# Patient Record
Sex: MALE | Race: WHITE | URBAN - METROPOLITAN AREA
[De-identification: names, ages, dates, MRNs, and addresses within clinical notes are randomized per-mention and may not be internally consistent; named-entity substitution may affect disease eponyms.]

---

## 2017-01-19 ENCOUNTER — PRE VISIT (OUTPATIENT)
Dept: OTOLARYNGOLOGY | Facility: CLINIC | Age: 57
End: 2017-01-19

## 2017-01-19 NOTE — TELEPHONE ENCOUNTER
1.  Date/reason for appt: 2/22/17 sinus/facial pain   2.  Referring provider: self   3.  Call to patient (Yes / No - short description): no, transferred from    4.  Previous care at / records requested from: [facility], [facility] and [facility] [place]   5.  Other: Emailed ROIs to [email or web address]

## 2017-01-23 NOTE — TELEPHONE ENCOUNTER
Records received from [facility]. Forwarded records to ENT clinic.    Records included:  Neurological Consultation Note: 12/21/16  Radiology reports (Waiting in imaging disc)   -MRI Brain 12/30/16   -Cervical MRI Angio 12/30/16   -Cerebral MR Angio 12/30/16      Missing:    -Pt has additional records/imaging with PCP Dr. Marcelo Kurtz, Dr. Worthington (Otholaryngology) & Dr. Banuelos (Otholaryngology) @ [facility]   (Daphnie already requested on 1/19/17, waiting on records.)     -[facility] 8/29/16, 12/8/16 (Need to request)     -Pt had left septoplasty and turbinoplasty done Sept 2016 (Not sure where)

## 2017-02-07 NOTE — TELEPHONE ENCOUNTER
Faxed request to [facility] for images, [facility] for op-note and [facility].

## 2017-02-07 NOTE — TELEPHONE ENCOUNTER
Radiology reports received from [facility], will forward to clinic.  CT sinuses on 8/29/16, 12/8/16

## 2017-02-10 NOTE — TELEPHONE ENCOUNTER
Additional records received from [facility], will forward to clinic. Faxed request for images to be pushed.

## 2017-02-16 NOTE — TELEPHONE ENCOUNTER
Imaging from [facility] still not received. Called and spoke with Marcello, she will mail imaging disc.

## 2017-02-22 ENCOUNTER — OFFICE VISIT (OUTPATIENT)
Dept: OTOLARYNGOLOGY | Facility: CLINIC | Age: 57
End: 2017-02-22

## 2017-02-22 VITALS — BODY MASS INDEX: 28.61 KG/M2 | HEIGHT: 66 IN | WEIGHT: 178 LBS

## 2017-02-22 DIAGNOSIS — R51.9 FACIAL PAIN: Primary | ICD-10-CM

## 2017-02-22 RX ORDER — IBUPROFEN 800 MG/1
800 TABLET, FILM COATED ORAL
COMMUNITY
Start: 2016-09-26

## 2017-02-22 ASSESSMENT — PAIN SCALES - GENERAL: PAINLEVEL: MILD PAIN (2)

## 2017-02-22 NOTE — PATIENT INSTRUCTIONS
Plan of care:  After Dr Christensen has talked to Dr Tom we will give you a call  Clinic contact information:  1. To schedule an appointment call [phone], option 1  2. To talk to the Triage RN call [phone], option 3  3. If you need to speak to Edith or get a message to your doctor on a Friday, call the triage RN  4. EdithRN: [phone]  5. Surgery scheduling:      Amparo Truong: [phone]      Sandy Carter: [phone]  6. Fax: [phone]  7. [phone]

## 2017-02-22 NOTE — LETTER
2017       RE: Anjel Ying  [address] [place] [address]     Dear Colleague,    Thank you for referring your patient, Anjel Ying, to the [facility] E[facility] at [facility]. Please see a copy of my visit note below.      Otolaryngology Adult Consultation    Patient: Anjel Ying   : 1960     Patient presents with:  Consult:   Chief Complaint   Patient presents with     Consult     New Rhinology Facial/sinus pain, feels congested constantly         Referring Provider: Referred Self   Consulting Physician:  Elvie Christensen MD       Assessment/Plan: Anjel's nasal exam is essentially normal.  He has some fullness of the inferior turbinate on that right side and he has nasal valve collapse, left greater than right, so he still has ongoing mild obstruction on that right side.  I do not think that has anything to do his pain, however, I suspect his pain is either related to previous neck injury or related to possible residual issues from his wisdom tooth extraction.  I am going to discuss this case with my colleague, Dr. Tom.  We will see if we can have this area of the wisdom tooth further evaluated.  We may also have him go back to his primary to discuss physical therapy or other treatments for his neck pain.          HPI: Anjel Ying is a 56 year old male seen today in the Otolaryngology Clinic for greater than two year history of right-sided facial pain.  He states that he had possibly been having pain before that off and on but he woke up in January of last year with what he describes as a light headache.  It was fairly mild but was persistent for 5-6 weeks.  It was in the right cheek and extended up around his right eye to his forehead.  He did take a course of antibiotics and had some improvement and the pain then became localized to the right cheek area where it continues to persist to this day.  He describes that it is currently 1.5-2/10 but will get up  to 7-8/10.  It is worse after he drinks alcohol.  It is better if he sticks a Kleenex up in his nose and removes some sticky mucous.  He can breathe better for a short period of time.  The pain is slightly better for just a few seconds to minutes.  He did see Dr. Worthington and Dr. Banuelos.  He underwent a septoplasty in September of last year.  He states that overall his symptoms were slightly better for a while but he is now back to his baseline right-sided facial pain.  Four years ago, he had a right upper wisdom tooth removed.  It was very difficult to remove and took several hours.  He has not had followup for that.  He denies any skin changes at the time that he had the facial pain such as blisters on his face to suggest zoster.  He occasionally has some numbness under the eye and in the cheek area.  He works in a machine shop.  He cannot describe any trauma that could have caused this.     H/O neck injury, surgery recommended, he declined.  Still has pain.      No current outpatient prescriptions on file prior to visit.  No current facility-administered medications on file prior to visit.      No Known Allergies    Past Medical History   Diagnosis Date     Allergic rhinitis as a child     as a child     Chronic sinusitis 2 years     2 years     Tinnitus 10 years     10 years         Review of Systems   ENT ROS 2/20/2017   Ears, Nose, Throat Ringing/noise in ears, Nasal congestion or drainage   Allergy/Immunology Allergies or hay fever        14 point ROS neg other than the symptoms noted above.    Physical Exam:    General Assessment   The patient is alert, oriented and in no acute distress.   Head/Face/Scalp  Grossly normal.   Ears  Normal canals, auricles and tympanic membranes.   Nose  External nose is straight, skin is normal. Intranasal exam (anterior rhinoscopy) reveals normal moist mucosa, turbinate tissue without edema, erythema or crusting.  Septum mainly in the midline.    Mouth  Oral cavity shows  healthy mucosa with out ulceration, masses or other lesions involving the tongue, palate, buccal mucosa, floor of mouth or gingiva.  Dentition in good repair.  Oropharynx with normal tonsils, posterior wall and base of tongue.  Significant pain R posterior upper alveolus to palpation  Neck  No significant adenopathy, thyroid or salivary gland abnormality. Posterior neck pain on right to palpation    Procedure:  Nasal endoscopy performed to examine the posterior nasal passages for pathology.    Verbal consent obtained. Topical anesthetic/decongestant solution applied per patient request.   A rigid endoscope was passed into each nasal cavity separately.  Findings are as follows:   Left middle meatus:  Normal healthy meatus, no purulence or polyps.    Left posterior nasal cavity:  No masses, lesions or abnormal tissue.   Right middle meatus:  Normal healthy meatus, no purulence or polyps   Right posterior nasal cavity:  No masses, lesions or abnormal tissue, generous posterior inferior turbinate   Nasopharynx:  Clear, no lesions, crusting or inflammation       SNOT20:  Average and Sum Patient-Supplied Answers for the SNOT-20 2/20/2017   Total SNOT-20 Score (Average) 1.54   SNOT Score: Sum of responses 27     Imaging:    Outside imaging reviewed - normal MRI.  CT report reviewed, exam not available.    Again, thank you for allowing me to participate in the care of your patient.      Sincerely,    Elvie Christensen MD

## 2017-02-22 NOTE — PROGRESS NOTES
Otolaryngology Adult Consultation    Patient: Anjel Ying   : 1960     Patient presents with:  Consult:   Chief Complaint   Patient presents with     Consult     New Rhinology Facial/sinus pain, feels congested constantly         Referring Provider: Referred Self   Consulting Physician:  Elvie Christensen MD       Assessment/Plan: Anjel's nasal exam is essentially normal.  He has some fullness of the inferior turbinate on that right side and he has nasal valve collapse, left greater than right, so he still has ongoing mild obstruction on that right side.  I do not think that has anything to do his pain, however, I suspect his pain is either related to previous neck injury or related to possible residual issues from his wisdom tooth extraction.  I am going to discuss this case with my colleague, Dr. Tom.  We will see if we can have this area of the wisdom tooth further evaluated.  We may also have him go back to his primary to discuss physical therapy or other treatments for his neck pain.          HPI: Anjel Ying is a 56 year old male seen today in the Otolaryngology Clinic for greater than two year history of right-sided facial pain.  He states that he had possibly been having pain before that off and on but he woke up in January of last year with what he describes as a light headache.  It was fairly mild but was persistent for 5-6 weeks.  It was in the right cheek and extended up around his right eye to his forehead.  He did take a course of antibiotics and had some improvement and the pain then became localized to the right cheek area where it continues to persist to this day.  He describes that it is currently 1.5-2/10 but will get up to 7-8/10.  It is worse after he drinks alcohol.  It is better if he sticks a Kleenex up in his nose and removes some sticky mucous.  He can breathe better for a short period of time.  The pain is slightly better for just a few seconds to minutes.  He did see Dr. Worthington  and Dr. Banuelos.  He underwent a septoplasty in September of last year.  He states that overall his symptoms were slightly better for a while but he is now back to his baseline right-sided facial pain.  Four years ago, he had a right upper wisdom tooth removed.  It was very difficult to remove and took several hours.  He has not had followup for that.  He denies any skin changes at the time that he had the facial pain such as blisters on his face to suggest zoster.  He occasionally has some numbness under the eye and in the cheek area.  He works in a machine shop.  He cannot describe any trauma that could have caused this.     H/O neck injury, surgery recommended, he declined.  Still has pain.      No current outpatient prescriptions on file prior to visit.  No current facility-administered medications on file prior to visit.      No Known Allergies    Past Medical History   Diagnosis Date     Allergic rhinitis as a child     as a child     Chronic sinusitis 2 years     2 years     Tinnitus 10 years     10 years         Review of Systems   ENT ROS 2/20/2017   Ears, Nose, Throat Ringing/noise in ears, Nasal congestion or drainage   Allergy/Immunology Allergies or hay fever        14 point ROS neg other than the symptoms noted above.    Physical Exam:    General Assessment   The patient is alert, oriented and in no acute distress.   Head/Face/Scalp  Grossly normal.   Ears  Normal canals, auricles and tympanic membranes.   Nose  External nose is straight, skin is normal. Intranasal exam (anterior rhinoscopy) reveals normal moist mucosa, turbinate tissue without edema, erythema or crusting.  Septum mainly in the midline.    Mouth  Oral cavity shows healthy mucosa with out ulceration, masses or other lesions involving the tongue, palate, buccal mucosa, floor of mouth or gingiva.  Dentition in good repair.  Oropharynx with normal tonsils, posterior wall and base of tongue.  Significant pain R posterior upper alveolus to  palpation  Neck  No significant adenopathy, thyroid or salivary gland abnormality. Posterior neck pain on right to palpation    Procedure:  Nasal endoscopy performed to examine the posterior nasal passages for pathology.    Verbal consent obtained. Topical anesthetic/decongestant solution applied per patient request.   A rigid endoscope was passed into each nasal cavity separately.  Findings are as follows:   Left middle meatus:  Normal healthy meatus, no purulence or polyps.    Left posterior nasal cavity:  No masses, lesions or abnormal tissue.   Right middle meatus:  Normal healthy meatus, no purulence or polyps   Right posterior nasal cavity:  No masses, lesions or abnormal tissue, generous posterior inferior turbinate   Nasopharynx:  Clear, no lesions, crusting or inflammation       SNOT20:  Average and Sum Patient-Supplied Answers for the SNOT-20 2/20/2017   Total SNOT-20 Score (Average) 1.54   SNOT Score: Sum of responses 27       Imaging:    Outside imaging reviewed - normal MRI.  CT report reviewed, exam not available.

## 2017-02-22 NOTE — MR AVS SNAPSHOT
After Visit Summary   2017    Anjel Ying    MRN: [id number]           Patient Information     Date Of Birth          1960        Visit Information        Provider Department      2017 7:15 AM Elvie Christensen MD M [facility] Ear Nose and Throat        Care Instructions    Plan of care:  After Dr Christensen has talked to Dr Tom we will give you a call  Clinic contact information:  1. To schedule an appointment call [phone], option 1  2. To talk to the Triage RN call [phone], option 3  3. If you need to speak to Edith or get a message to your doctor on a Friday, call the triage RN  4. EdithRN: [phone]  5. Surgery scheduling:      Amparo Truong: [phone]      Sandy Carter: [phone]  6. Fax: [phone]  7. [phone]          Follow-ups after your visit        Who to contact     Please call your clinic at [phone] to:    Ask questions about your health    Make or cancel appointments    Discuss your medicines    Learn about your test results    Speak to your doctor   If you have compliments or concerns about an experience at your clinic, or if you wish to file a complaint, please contact [facility] Patient Relations at [phone] or email us at [email or web address]         Additional Information About Your Visit        MyChart Information     [employer] gives you secure access to your electronic health record. If you see a primary care provider, you can also send messages to your care team and make appointments. If you have questions, please call your primary care clinic.  If you do not have a primary care provider, please call [phone] and they will assist you.      [employer] is an electronic gateway that provides easy, online access to your medical records. With [employer], you can request a clinic appointment, read your test results, renew a prescription or communicate with your care team.     To access your existing  "account, please contact your [facility] or call [phone] for assistance.        Care EveryWhere ID     This is your Care EveryWhere ID. This could be used by other organizations to access your [place] medical records  [id number]        Your Vitals Were     Height BMI (Body Mass Index)                1.67 m (5' 5.75\") 28.95 kg/m2           Blood Pressure from Last 3 Encounters:   No data found for BP    Weight from Last 3 Encounters:   02/22/17 80.7 kg (178 lb)              Today, you had the following     No orders found for display       Primary Care Provider Office Phone # Fax #    Marcelo Kurtz [phone]       [address] BLAKE [address] [place] [address]        Thank you!     Thank you for choosing [facility] EAR NOSE AND THROAT  for your care. Our goal is always to provide you with excellent care. Hearing back from our patients is one way we can continue to improve our services. Please take a few minutes to complete the written survey that you may receive in the mail after your visit with us. Thank you!             Your Updated Medication List - Protect others around you: Learn how to safely use, store and throw away your medicines at www.disposemymeds.org.          This list is accurate as of: 2/22/17  7:42 AM.  Always use your most recent med list.                   Brand Name Dispense Instructions for use    ibuprofen 800 MG tablet    ADVIL/MOTRIN     Take 800 mg by mouth         "

## 2017-02-23 ENCOUNTER — CARE COORDINATION (OUTPATIENT)
Dept: OTOLARYNGOLOGY | Facility: CLINIC | Age: 57
End: 2017-02-23

## 2017-02-23 NOTE — PROGRESS NOTES
Plan of care:  Dr Christensen discussed patient with Dr Tom-pt referred to Dr Tom  Discussed with patient, contact info given to patient, patient will make appointment in [facility]: [phone]

## 2017-04-18 ENCOUNTER — TRANSFERRED RECORDS (OUTPATIENT)
Dept: HEALTH INFORMATION MANAGEMENT | Facility: CLINIC | Age: 57
End: 2017-04-18

## 2019-11-07 ENCOUNTER — HEALTH MAINTENANCE LETTER (OUTPATIENT)
Age: 59
End: 2019-11-07

## 2020-11-29 ENCOUNTER — HEALTH MAINTENANCE LETTER (OUTPATIENT)
Age: 60
End: 2020-11-29

## 2021-06-13 ENCOUNTER — HOSPITAL ENCOUNTER (EMERGENCY)
Dept: HOSPITAL 11 - JP.ED | Age: 61
Discharge: HOME | End: 2021-06-13
Payer: COMMERCIAL
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
## 2021-06-13 DIAGNOSIS — S80.11XA: Primary | ICD-10-CM
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
## 2021-09-25 ENCOUNTER — HEALTH MAINTENANCE LETTER (OUTPATIENT)
Age: 61
End: 2021-09-25

## 2022-01-09 ENCOUNTER — HEALTH MAINTENANCE LETTER (OUTPATIENT)
Age: 62
End: 2022-01-09

## 2022-12-26 ENCOUNTER — HEALTH MAINTENANCE LETTER (OUTPATIENT)
Age: 62
End: 2022-12-26

## 2023-04-16 ENCOUNTER — HEALTH MAINTENANCE LETTER (OUTPATIENT)
Age: 63
End: 2023-04-16